# Patient Record
Sex: MALE | Race: BLACK OR AFRICAN AMERICAN
[De-identification: names, ages, dates, MRNs, and addresses within clinical notes are randomized per-mention and may not be internally consistent; named-entity substitution may affect disease eponyms.]

---

## 2018-01-16 ENCOUNTER — HOSPITAL ENCOUNTER (OUTPATIENT)
Dept: HOSPITAL 72 - PAN | Age: 83
Discharge: HOME | End: 2018-01-16
Payer: MEDICARE

## 2018-01-16 VITALS — DIASTOLIC BLOOD PRESSURE: 63 MMHG | SYSTOLIC BLOOD PRESSURE: 164 MMHG

## 2018-01-16 VITALS — WEIGHT: 200 LBS | BODY MASS INDEX: 28.63 KG/M2 | HEIGHT: 70 IN

## 2018-01-16 DIAGNOSIS — M19.90: ICD-10-CM

## 2018-01-16 DIAGNOSIS — Z91.81: ICD-10-CM

## 2018-01-16 DIAGNOSIS — C61: ICD-10-CM

## 2018-01-16 DIAGNOSIS — K57.90: ICD-10-CM

## 2018-01-16 DIAGNOSIS — N40.0: ICD-10-CM

## 2018-01-16 DIAGNOSIS — R63.4: Primary | ICD-10-CM

## 2018-01-16 DIAGNOSIS — Z86.010: ICD-10-CM

## 2018-01-16 PROCEDURE — 99202 OFFICE O/P NEW SF 15 MIN: CPT

## 2018-01-16 NOTE — GI INITIAL CONSULT NOTE
History of Present Illness


General


Date patient seen:  Jan 16, 2018


Time patient seen:  10:59


Referring physician:  TD GORDON


Reason for Consultation:  UNINTENTIONAL WEIGHT LOSS





Present Illness


HPI


88 year old male patient referred by Dr. Gordon for unintentional weight 

loss.  According to patient he's lost approximately 20-40 pounds over the 

course of the year.  No complaints of GI symptoms at the time.  [facility] records 

reviewed >> patient had colonoscopy and cardiac cath in 2011.   Patient is fall 

risk.


Home Meds


Active Scripts


Ondansetron (Zofran) 4 Mg Tablet, 4 MG ORAL Q6H Y for Nausea & Vomiting, #20 

TAB 0 Refills


   Prov:POLINA TORRES M.D.         10/12/16


Hydrocodone Bit/Acetaminophen 5-325* (NORCO 5-325*) 1 Each Tablet, 1 TAB ORAL 

Q6H Y for For Pain, #12 TAB 0 Refills


   Prov:JANETTE CHEEMA         6/19/15


Reported Medications


Enzalutamide (XTANDI) 40 Mg Capsule, 40 MG ORAL, MG 0 Refills


   1/16/18


Multivitamins* (MULTIVITAMINS*) 1 Each Tablet, 1 TAB ORAL DAILY, TAB 0 Refills


   10/12/16


Med list reviewed/reconciled:  Yes


Allergies:  


Coded Allergies:  


     NO KNOWN ALLERGIES (Unverified  Allergy, Unknown, 6/19/15)





Patient History


History Provided By:  Patient, Medical Record


PMH Narrative


Diverticulitis


Arthritis


BPH


Prostate CA with radiation tx


multiple colonic polyps


diverticulosis





Past Surgical History:


colonoscopy 2011


cardiac cath 2011


Social History Narrative


Tobacco use 25+ years


ETOH use social


coffee daily





Review of Systems


All Other Systems:  negative except mentioned in HPI





Physical Exam


T 98.4


/63


P ~ 50-80 (irregular)


95 RA





HT 5'10


 lbs


Sp02 EP Interpretation:  reviewed, normal


General Appearance:  well appearing, no apparent distress, alert


Head:  normocephalic


EENT:  PERRL/EOMI, normal ENT inspection


Neck:  supple


Respiratory:  normal breath sounds, no respiratory distress


Cardiovascular:  normal rate


Gastrointestinal:  normal inspection, non tender, soft, normal bowel sounds, non

-distended


Rectal:  deferred


Genitourinary:  deferred


Musculoskeletal:  decreased range of motion - use of cane


Neurologic:  normal inspection, alert, oriented x3, responsive


Psychiatric:  normal inspection, judgement/insight normal, memory normal


Skin:  normal inspection, normal color, no rash, warm/dry, palpation normal, 

well hydrated


Lymphatic:  normal inspection, no adenopathy





GI: Plan


Problems:  


(1) Prostate CA


(2) BPH (benign prostatic hyperplasia)


(3) Hx of colonic polyp


(4) Diverticulosis


(5) Weight loss, unintentional


Plan


given advanced age, conservative management >> will consider GI procedures 

pending work up.


Pan CT ordered


labs to be drawn >> CBC, CMP, CEA, PSA, TSH/Free T4, CA 19-9.


RTC after imaging studies and lab draws.


fu with cardiologist given irregular heartbeat/HTN.





Seen with Dr. Ozuna.


Thank you for this patient referral.











Mae Torres N.P. Jan 16, 2018 11:10

## 2018-01-17 ENCOUNTER — HOSPITAL ENCOUNTER (OUTPATIENT)
Dept: HOSPITAL 72 - CAT | Age: 83
Discharge: HOME | End: 2018-01-17
Payer: MEDICARE

## 2018-01-17 DIAGNOSIS — I70.90: ICD-10-CM

## 2018-01-17 DIAGNOSIS — R63.4: Primary | ICD-10-CM

## 2018-01-17 DIAGNOSIS — R07.9: ICD-10-CM

## 2018-01-17 DIAGNOSIS — M47.9: ICD-10-CM

## 2018-01-17 DIAGNOSIS — J90: ICD-10-CM

## 2018-01-17 DIAGNOSIS — K57.90: ICD-10-CM

## 2018-01-17 DIAGNOSIS — K80.80: ICD-10-CM

## 2018-01-17 LAB
ADD MANUAL DIFF: NO
ALBUMIN SERPL-MCNC: 3.5 G/DL (ref 3.4–5)
ALBUMIN/GLOB SERPL: 0.8 {RATIO} (ref 1–2.7)
ALP SERPL-CCNC: 59 U/L (ref 46–116)
ALT SERPL-CCNC: 14 U/L (ref 12–78)
ANION GAP SERPL CALC-SCNC: 4 MMOL/L (ref 5–15)
AST SERPL-CCNC: 19 U/L (ref 15–37)
BASOPHILS NFR BLD AUTO: 0.8 % (ref 0–2)
BILIRUB SERPL-MCNC: 0.4 MG/DL (ref 0.2–1)
BUN SERPL-MCNC: 25 MG/DL (ref 7–18)
CALCIUM SERPL-MCNC: 10.2 MG/DL (ref 8.5–10.1)
CHLORIDE SERPL-SCNC: 106 MMOL/L (ref 98–107)
CO2 SERPL-SCNC: 32 MMOL/L (ref 21–32)
CREAT SERPL-MCNC: 0.9 MG/DL (ref 0.55–1.3)
EOSINOPHIL NFR BLD AUTO: 1.7 % (ref 0–3)
ERYTHROCYTE [DISTWIDTH] IN BLOOD BY AUTOMATED COUNT: 11.2 % (ref 11.6–14.8)
GLOBULIN SER-MCNC: 4.5 G/DL
HCT VFR BLD CALC: 40.4 % (ref 42–52)
HGB BLD-MCNC: 13.4 G/DL (ref 14.2–18)
LYMPHOCYTES NFR BLD AUTO: 30 % (ref 20–45)
MCV RBC AUTO: 95 FL (ref 80–99)
MONOCYTES NFR BLD AUTO: 8 % (ref 1–10)
NEUTROPHILS NFR BLD AUTO: 59.5 % (ref 45–75)
PLATELET # BLD: 142 K/UL (ref 150–450)
POTASSIUM SERPL-SCNC: 5.2 MMOL/L (ref 3.5–5.1)
RBC # BLD AUTO: 4.25 M/UL (ref 4.7–6.1)
SODIUM SERPL-SCNC: 142 MMOL/L (ref 136–145)
WBC # BLD AUTO: 3.6 K/UL (ref 4.8–10.8)

## 2018-01-17 PROCEDURE — 71260 CT THORAX DX C+: CPT

## 2018-01-17 PROCEDURE — 82378 CARCINOEMBRYONIC ANTIGEN: CPT

## 2018-01-17 PROCEDURE — 74177 CT ABD & PELVIS W/CONTRAST: CPT

## 2018-01-17 PROCEDURE — 85025 COMPLETE CBC W/AUTO DIFF WBC: CPT

## 2018-01-17 PROCEDURE — 80053 COMPREHEN METABOLIC PANEL: CPT

## 2018-01-17 PROCEDURE — 84153 ASSAY OF PSA TOTAL: CPT

## 2018-01-17 PROCEDURE — 84439 ASSAY OF FREE THYROXINE: CPT

## 2018-01-17 PROCEDURE — 84443 ASSAY THYROID STIM HORMONE: CPT

## 2018-01-17 PROCEDURE — 36415 COLL VENOUS BLD VENIPUNCTURE: CPT

## 2018-01-17 NOTE — DIAGNOSTIC IMAGING REPORT
Indication: Weight loss. Chest and abdominal pain

 

Technique: Continuous helical transaxial imaging of the chest, abdomen and pelvis was

obtained from the lung bases to the pubic symphysis during intravenous contrast

administration. Multiple phases of enhancement obtained. Coronal 2-D reformats were

also obtained. Study obtained in a Siemens sensation 64 slice CT. Automatic Exposure

Control was utilized.

 

 

Total Dose length Product (DLP):  1760.85 mGycm

 

CT Dose Index Volume (CTDIvol):   17.31,15.98 mGy

 

Comparison: None

 

Findings: Lungs are essentially clear. No mass or nodules are identified. There is no

adenopathy identified. There is a small pericardial effusion. The aorta is mildly

calcified. The right hemidiaphragm is somewhat elevated in a localized fashion

involving the anterior aspect of the right hemidiaphragm. This has the appearance of

an eventration or possibly a small anterior diaphragmatic hernia. Part of the liver

and chest exposed hepatic flexure are contained in the herniated segment.

 

There are innumerable cysts of varying sizes within both kidneys. There are

gallstones. The liver is unremarkable. Spleen is normal size. Pancreas is grossly

unremarkable. There is a punctate calcification in the right kidney. This could be

vascular in nature or a tiny nonobstructive stone. The pancreas is unremarkable.

There are diverticula in the colon especially in the sigmoid region. Urinary bladder

is unremarkable. There is narrowing of vacuum intervertebral discs and accompanying

endplate osteophyte formation.  Hypertrophied facet joints also demonstrated.

 

IMPRESSION:

 

No evidence of malignancy on the basis of this examination.

 

Innumerable cysts within both kidneys. The nature of the cystic disease is unknown.

Consider autosomal dominant polycystic kidney disease, other possibilities.

 

Small pericardial effusion

 

Gallstones

 

Focal eventration versus anterior right diaphragmatic hernia.

 

Interposition of hepatic flexure between the diaphragm and liver.

 

Moderate spondylosis

 

Sigmoid diverticulosis.

 

Atherosclerotic vascular disease.

 

Tiny calcification in the right kidney. Vascular disease versus small nonobstructive

stone.

 

 

 

 

 

The CT scanner at [facility] is accredited by the American College of

Radiology and the scans are performed using dose optimization techniques as

appropriate to a performed exam including Automatic Exposure control.

## 2018-02-05 ENCOUNTER — HOSPITAL ENCOUNTER (OUTPATIENT)
Dept: HOSPITAL 72 - PAN | Age: 83
Discharge: HOME | End: 2018-02-05
Payer: MEDICARE

## 2018-02-05 DIAGNOSIS — I10: ICD-10-CM

## 2018-02-05 DIAGNOSIS — C61: ICD-10-CM

## 2018-02-05 DIAGNOSIS — I49.9: ICD-10-CM

## 2018-02-05 DIAGNOSIS — R63.4: Primary | ICD-10-CM

## 2018-02-05 PROCEDURE — 99212 OFFICE O/P EST SF 10 MIN: CPT

## 2018-02-05 NOTE — GI PROGRESS NOTE
Assessment/Plan


Problems:  


(1) HTN (hypertension)


ICD Codes:  I10 - Essential (primary) hypertension


SNOMED:  39451941


(2) Irregular heart rate


ICD Codes:  I49.9 - Cardiac arrhythmia, unspecified


SNOMED:  011490479, 507310353, 520439559


(3) Weight loss, unintentional


ICD Codes:  R63.4 - Abnormal weight loss


SNOMED:  007176136


(4) Prostate CA


ICD Codes:  C61 - Malignant neoplasm of prostate


SNOMED:  352683465


Status:  stable


Status Narrative


Seen with Dr. Pitts.


Assessment/Plan


Pan CT reviewed >> negative for malignancy


labs reviewed >> discussed with patient





Needs repeat EGD/colonoscopy given history of multiple polyps but will require 

cardiac clearance


d/w with Dr. Gordon about cardiology referral


will schedule for procedures once cleared


RTC after clearance








The patient was seen and examined at bedside and all new and available data was 

reviewed in the patients chart. I agree with the above findings, impression 

and plan.  (Patient seen earlier today. Signature stamp does not reflect 

patient encounter time.). - Paulette Pitts MD





Subjective


Gastrointestinal/Abdominal:  Reports: no symptoms


Subjective


weight gain of 4lbs since last visit





Objective


T 98.0


/71


HR irregular


95 RA





.5


General Appearance:  WD/WN, no apparent distress, alert


Cardiovascular:  normal rate


Respiratory/Chest:  normal breath sounds, no respiratory distress


Abdominal Exam:  normal bowel sounds, non tender, soft


Extremities:  normal range of motion, non-tender, other - ambulates with cane











Mae Telles NDEBBY Feb 5, 2018 14:11


MAGNUS PITTS Feb 6, 2018 10:14

## 2018-03-19 ENCOUNTER — HOSPITAL ENCOUNTER (OUTPATIENT)
Dept: HOSPITAL 72 - PAN | Age: 83
Discharge: HOME | End: 2018-03-19
Payer: MEDICARE

## 2018-03-19 VITALS — SYSTOLIC BLOOD PRESSURE: 143 MMHG | DIASTOLIC BLOOD PRESSURE: 65 MMHG

## 2018-03-19 DIAGNOSIS — C61: ICD-10-CM

## 2018-03-19 DIAGNOSIS — R63.4: ICD-10-CM

## 2018-03-19 DIAGNOSIS — I49.9: Primary | ICD-10-CM

## 2018-03-19 DIAGNOSIS — Z86.010: ICD-10-CM

## 2018-03-19 PROCEDURE — 99211 OFF/OP EST MAY X REQ PHY/QHP: CPT

## 2018-03-19 NOTE — GI PROGRESS NOTE
Assessment/Plan


Problems:  


(1) Irregular heart rate


ICD Codes:  I49.9 - Cardiac arrhythmia, unspecified


SNOMED:  683229962, 156468536, 380557184


(2) Weight loss, unintentional


ICD Codes:  R63.4 - Abnormal weight loss


SNOMED:  053173045


(3) Prostate CA


ICD Codes:  C61 - Malignant neoplasm of prostate


SNOMED:  772090142


(4) Hx of colonic polyp


ICD Codes:  Z86.010 - Personal history of colonic polyps


SNOMED:  902259666


(5) Colonoscopy planned


SNOMED:  297046959


Status:  stable


Status Narrative


Discussed with Dr. Ozuna.


Assessment/Plan


Pan CT reviewed >> negative for malignancy


labs reviewed >> discussed with patient





EGD/colonoscopy scheduled 3/26/17.


- CLD & (Nulytely/Suprep/Movi-Prep) prep instructions given and acknowledged by 

patient.


- NPO @ MN day prior procedure explained.





Subjective


Gastrointestinal/Abdominal:  Reports: no symptoms





Objective


T 98.3


/65


HR 42


95 RA





 lbs


General Appearance:  WD/WN, no apparent distress, alert


Cardiovascular:  normal rate


Respiratory/Chest:  normal breath sounds, no respiratory distress


Abdominal Exam:  normal bowel sounds, non tender, soft


Extremities:  normal range of motion, non-tender











Mae Telles N.P. Mar 19, 2018 14:49

## 2018-03-26 ENCOUNTER — HOSPITAL ENCOUNTER (OUTPATIENT)
Dept: HOSPITAL 72 - GAS | Age: 83
Discharge: HOME | End: 2018-03-26
Payer: MEDICARE

## 2018-03-26 VITALS — DIASTOLIC BLOOD PRESSURE: 79 MMHG | SYSTOLIC BLOOD PRESSURE: 161 MMHG

## 2018-03-26 VITALS — SYSTOLIC BLOOD PRESSURE: 179 MMHG | DIASTOLIC BLOOD PRESSURE: 78 MMHG

## 2018-03-26 VITALS — SYSTOLIC BLOOD PRESSURE: 161 MMHG | DIASTOLIC BLOOD PRESSURE: 89 MMHG

## 2018-03-26 VITALS — WEIGHT: 204 LBS | HEIGHT: 72 IN | BODY MASS INDEX: 27.63 KG/M2

## 2018-03-26 VITALS — DIASTOLIC BLOOD PRESSURE: 76 MMHG | SYSTOLIC BLOOD PRESSURE: 180 MMHG

## 2018-03-26 VITALS — DIASTOLIC BLOOD PRESSURE: 68 MMHG | SYSTOLIC BLOOD PRESSURE: 153 MMHG

## 2018-03-26 VITALS — SYSTOLIC BLOOD PRESSURE: 180 MMHG | DIASTOLIC BLOOD PRESSURE: 72 MMHG

## 2018-03-26 VITALS — DIASTOLIC BLOOD PRESSURE: 77 MMHG | SYSTOLIC BLOOD PRESSURE: 185 MMHG

## 2018-03-26 DIAGNOSIS — K29.70: Primary | ICD-10-CM

## 2018-03-26 DIAGNOSIS — I10: ICD-10-CM

## 2018-03-26 DIAGNOSIS — K31.7: ICD-10-CM

## 2018-03-26 DIAGNOSIS — K57.90: ICD-10-CM

## 2018-03-26 DIAGNOSIS — K63.5: ICD-10-CM

## 2018-03-26 DIAGNOSIS — K64.8: ICD-10-CM

## 2018-03-26 DIAGNOSIS — M19.90: ICD-10-CM

## 2018-03-26 DIAGNOSIS — K21.9: ICD-10-CM

## 2018-03-26 PROCEDURE — 45380 COLONOSCOPY AND BIOPSY: CPT

## 2018-03-26 PROCEDURE — 94150 VITAL CAPACITY TEST: CPT

## 2018-03-26 PROCEDURE — 93005 ELECTROCARDIOGRAM TRACING: CPT

## 2018-03-26 PROCEDURE — 94003 VENT MGMT INPAT SUBQ DAY: CPT

## 2018-03-26 PROCEDURE — 82378 CARCINOEMBRYONIC ANTIGEN: CPT

## 2018-03-26 PROCEDURE — 43239 EGD BIOPSY SINGLE/MULTIPLE: CPT

## 2018-03-26 NOTE — SHORT STAY SURGERY H&P
History of Present Illness


History of Present Illness


Chief Complaint


see recent consult note


HPI


Solis Horne is a 89 year old male who was admitted on  for Colon Polyps,

Abnormal Weight Lose





Patient History


Allergies:  


Coded Allergies:  


     NO KNOWN ALLERGIES (Unverified  Allergy, Unknown, 6/19/15)





Medication History


Scheduled


Multivitamins* (Multivitamins*), 1 TAB ORAL DAILY, (Reported)





Discontinued Medications


Enzalutamide (Xtandi), 40 MG ORAL, (Reported)


   Discontinued Reason: MD discontinued med


Hydrocodone Bit/Acetaminophen 5-325* (Norco 5-325*), 1 TAB ORAL Q6H PRN for For 

Pain


   Discontinued Reason: MD discontinued med


Ondansetron (Zofran), 4 MG ORAL Q6H PRN for Nausea & Vomiting


   Discontinued Reason: Pt stopped taking med





Physical Exam


Vital Signs





Last Vital Signs








  Date Time  Temp Pulse Resp B/P (MAP) Pulse Ox O2 Delivery O2 Flow Rate FiO2


 


3/26/18 08:48 97.3 56 18 161/89 99 Room Air  





 97.3       











Plan


Attestation


Are the patient's medical conditions optimized for surgery?











MAGNUS PITTS Mar 26, 2018 10:13

## 2018-03-26 NOTE — ENDOSCOPY PROCEDURE NOTE
Endoscopy Procedure Note


General


Indication for Procedure:  wt loss


Procedures Performed:  EGD, colonoscopy


Operative Findings/Diagnosis:  gastric and colon polyps


Specimen:  yes


Pt Tolerated Procedure Well:  Yes


Estimated Blood Loss:  none





Anesthesia


Anesthesiologist:  lanie


Anesthesia:  MAC





Inserted Devices


Implant(s) used?:  No





Quality


Quality of Bowel Preparation:  Excellent


Did scope reach the cecum?:  Yes


Was there any complications?:  No





GI Core Measures


50 yrs or older w/o bx or poly:  No


10yrs. F/U not recommended:  Yes


If not recommended, why?:  Above average risk


10 yrs. F/U needed:  Yes


18 years or older w/prev. colo:  Yes


<3yrs. since last colonoscopy:  No











MAGNUS PITTS Mar 26, 2018 10:54

## 2018-03-26 NOTE — ANETHESIA PREOPERATIVE EVAL
Anesthesia Pre-op PMH/ROS


General


Date of Evaluation:  Mar 26, 2018


Time of Evaluation:  10:01


Anesthesiologist:  Germaine


ASA Score:  ASA 3


Mallampati Score


Class I : Soft palate, uvula, fauces, pillars visible


Class II: Soft palate, uvula, fauces visible


Class III: Soft palate, base of uvula visible


Class IV: Only hard plate visible


Mallampati Classification:  Class II


Surgeon:  Laith


Diagnosis:  Abdominal pain weight loss


Surgical Procedure:  EGD Colonoscopy


Anesthesia History:  none


Family History:  no anesthesia problems


Allergies:  


Coded Allergies:  


     NO KNOWN ALLERGIES (Unverified  Allergy, Unknown, 6/19/15)


Medications:  see eMAR





Past Medical History


Cardiovascular:  Reports: HTN, arrhythmia; 


   Denies: CAD, MI, valve dz, other


Pulmonary:  Denies: asthma, COPD, CAROL, other


Gastrointestinal/Genitourinary:  Reports: GERD; 


   Denies: CRI, ESRD, other


Neurologic/Psychiatric:  Denies: dementia, CVA, depression/anxiety, TIA, other


Endocrine:  Denies: DM, hypothyroidism, steroids, other


HEENT:  Denies: cataract (L), cataract (R), glaucoma, Koi (L), Koi (R), other


Hematology/Immune:  Reports: anemia - mild


Musculoskeletal/Integumentary:  Reports: DJD; 


   Denies: OA, RA, DDD, edema, other


PMH Narrative:


as above


PSxH Narrative:


circumcision





Anesthesia Pre-op Phys. Exam


Physician Exam





Last Vital Signs








  Date Time  Temp Pulse Resp B/P (MAP) Pulse Ox O2 Delivery O2 Flow Rate FiO2


 


3/26/18 08:48 97.3 56 18 161/89 99 Room Air  





 97.3       








Constitutional:  NAD


Neurologic:  CN 2-12 intact


Cardiovascular:  no M/R/G, other - IIR


Respiratory:  CTA


Gastrointestinal:  S/NT/ND





Airway Exam


Mallampati Score:  Class II


MO:  limited


Neck:  stiff


ROM:  limited


Teeth:  missing


Dentures:  no upper, no lower





Anesthesia Pre-op A/P


Risk Assessment & Plan


Assessment:


ASA 3


Plan:


MAC


Status Change Before Surgery:  No











JORDI MYERS M.D. Mar 26, 2018 10:36

## 2018-03-26 NOTE — IMMEDIATE POST-OP EVALUATION
Immediate Post-Op Evalulation


Immediate Post-Op Evalulation


Procedure:  EGD Colonoscopy


Date of Evaluation:  Mar 26, 2018


Time of Evaluation:  11:01


IV Fluids:  600


Blood Products:  none


Estimated Blood Loss:  none


Urinary Output:  none


Blood Pressure Systolic:  165


Blood Pressure Diastolic:  76


Pulse Rate:  61


Respiratory Rate:  22


O2 Sat by Pulse Oximetry:  98


Temperature (Fahrenheit):  97.6


Pain Score (1-10):  1


Nausea:  No


Vomiting:  No


Complications


none


Patient Status:  awake, patent, none


Hydration Status:  adequate











JORDI MYERS M.D. Mar 26, 2018 11:02

## 2018-03-26 NOTE — PROCEDURE NOTE
DATE OF PROCEDURE:  03/26/2018



SURGEON:  Michele Ozuna M.D.



PROCEDURE:  Upper endoscopy with biopsy and colonoscopy with

biopsy.



ANESTHESIA:  Yoshi Herron M.D.



INSTRUMENT:  Olympus flexible adult endoscope and colonoscope.



INDICATION:  Weight loss.



REASON FOR PROCEDURE:  The procedure, risks, benefits, and possible

consequences, including hemorrhage, aspiration, perforation and infection,

and alternative treatments, were explained to the patient/legal guardian

by Dr. Michele Ozuna and the patient/legal guardian understood and

accepted these risks.



DESCRIPTION OF PROCEDURE:  After informed consent was obtained and the

patient was adequately sedated, the Olympus upper endoscope was advanced

from mouth to the second portion of the duodenum and retroflexion was

performed of the stomach.



The patient had evidence of diffuse moderate to severe gastritis.  There

was an area in the peripyloric region.  In the antrum of the stomach,

there was an about one centimeter raised lesion.  Differential diagnosis

would be a gastric polyp versus healing ulcer versus early  malignancy.

Biopsy from this lesion was obtained.



There was another polyp, smaller upon proximal to this one, which was

biopsied.  After that we did biopsy of the antrum and body.  At this time,

the upper endoscope was retrieved and the patient was turned over for

colonoscopy.



First, a rectal exam performed, which was positive for internal

hemorrhoids.  Then, the scope was advanced from the rectum into the cecum

documented by appendiceal orifice, ileocecal valve, and right upper

quadrant palpation.  Quality of prep was very good.



The patient had evidence of two polyps, diminutive in the ascending

colon removed with the cold biopsy forceps technique.  There was no

further polyp seen in this examination.  The patient has evidence of

diverticulosis, more prominent in the left compared to right colon.



Retroflexion of rectum showed evidence of medium-sized internal

hemorrhoids.



SUMMARY FINDINGS:

1. Gastric polyp versus early malignancy status post biopsy.

2. Another smaller gastric polyp.

3. Diffuse moderate to severe gastritis status post biopsy.

4. Two colonic polyps removed, see above for details.

5. Diverticulosis.

6. Internal hemorrhoids.



RECOMMENDATIONS:  Followup biopsy results and treat accordingly.









  ______________________________________________

  Michele Ozuna M.D.





DR:  CAN

D:  03/26/2018 10:53

T:  03/26/2018 20:18

JOB#:  [id number]

CC:

## 2018-03-26 NOTE — PRE-PROCEDURE NOTE/ATTESTATION
Pre-Procedure Note/Attestation


Complete Prior to Procedure


Planned Procedure:  not applicable


Procedure Narrative:


esophagogastroduodenoscopy and colonoscopy





Indications for Procedure


Pre-Operative Diagnosis:


wt loss





Attestation


I attest that I discussed the nature of the procedure; its benefits; risks and 

complications; and alternatives (and the risks and benefits of such alternatives

), prior to the procedure, with the patient (or the patient's legal 

representative).





I attest that, if there was a reasonable possibility of needing a blood 

transfusion, the patient (or the patient's legal representative) was given the 

[facility] standardized written summary, pursuant 

to the [place] Blood Safety Act ([place] Health and Safety Code # 1645, as 

amended).





I attest that I re-evaluated the patient just prior to the surgery and that 

there has been no change in the patient's H&P, except as documented below:











MAGNUS PITTS Mar 26, 2018 10:10

## 2018-03-26 NOTE — 48 HOUR POST ANESTHESIA EVAL
Post Anesthesia Evaluation


Procedure:  EGD Colonoscopy


Date of Evaluation:  Mar 26, 2018


Time of Evaluation:  11:31


Blood Pressure Systolic:  128


0:  76


Pulse Rate:  64


Respiratory Rate:  20


Temperature (Fahrenheit):  97.8


O2 Sat by Pulse Oximetry:  99


Airway:  patent


Nausea:  No


Vomiting:  No


Pain Intensity:  2


Hydration Status:  adequate


Cardiopulmonary Status:


stable


Mental Status/LOC:  patient returned to baseline


Follow-up Care/Observations:


n/a


Post-Anesthesia Complications:


none


Follow-up care needed:  ready to discharge











JORDI MYERS M.D. Mar 26, 2018 11:32

## 2018-04-09 ENCOUNTER — HOSPITAL ENCOUNTER (OUTPATIENT)
Dept: HOSPITAL 72 - PAN | Age: 83
Discharge: HOME | End: 2018-04-09
Payer: MEDICARE

## 2018-04-09 DIAGNOSIS — R13.10: ICD-10-CM

## 2018-04-09 DIAGNOSIS — B96.81: ICD-10-CM

## 2018-04-09 DIAGNOSIS — R63.4: Primary | ICD-10-CM

## 2018-04-09 PROCEDURE — 99212 OFFICE O/P EST SF 10 MIN: CPT

## 2018-04-09 NOTE — GI PROGRESS NOTE
Assessment/Plan


Problems:  


(1) H. pylori infection


ICD Codes:  A04.8 - Other specified bacterial intestinal infections


SNOMED:  627599492


(2) Weight loss, unintentional


ICD Codes:  R63.4 - Abnormal weight loss


SNOMED:  484028461


Status:  stable


Status Narrative


Seen with Dr. Ozuna.


Assessment/Plan


low grade dysphagia


H. Pylori positive





Recommendations


Miralax 17gm


colace


H. Pylori Treatment >>


- Amoxicillin 1g BID


- Biaxin 500mb BID


- Omeprazole 40mg x 2 weeks


followed by 


Omeprazole 40mg PO daily x 6 weeks


RTC x 2 month for repeat Breath Test/EGD schedule





The patient was seen and examined at bedside and all new and available data was 

reviewed in the patients chart. I agree with the above findings, impression 

and plan.  (Patient seen earlier today. Signature stamp does not reflect 

patient encounter time.). - Paulette Ozuna MD





Subjective


Subjective


no symptoms





Objective


T 98.0


/67


P 59


97 RA





.9 lbs


General Appearance:  WD/WN, no apparent distress, alert


Cardiovascular:  normal rate


Respiratory/Chest:  normal breath sounds, no respiratory distress


Abdominal Exam:  normal bowel sounds, non tender, soft


Extremities:  normal range of motion, non-tender











Mae Telles NDEBBY Apr 9, 2018 15:23


MAGNUS ZOUNA Apr 10, 2018 13:17

## 2018-06-11 ENCOUNTER — HOSPITAL ENCOUNTER (OUTPATIENT)
Dept: HOSPITAL 72 - PAN | Age: 83
Discharge: HOME | End: 2018-06-11
Payer: MEDICARE

## 2018-06-11 DIAGNOSIS — I10: ICD-10-CM

## 2018-06-11 DIAGNOSIS — K59.00: ICD-10-CM

## 2018-06-11 DIAGNOSIS — I49.9: ICD-10-CM

## 2018-06-11 DIAGNOSIS — N40.0: Primary | ICD-10-CM

## 2018-06-11 DIAGNOSIS — A04.8: ICD-10-CM

## 2018-06-11 PROCEDURE — 83013 H PYLORI (C-13) BREATH: CPT

## 2018-06-11 PROCEDURE — 99211 OFF/OP EST MAY X REQ PHY/QHP: CPT

## 2018-06-11 NOTE — GI PROGRESS NOTE
Assessment/Plan


Problems:  


(1) H. pylori infection


ICD Codes:  A04.8 - Other specified bacterial intestinal infections


SNOMED:  357722248


(2) Irregular heart rate


ICD Codes:  I49.9 - Cardiac arrhythmia, unspecified


SNOMED:  507085387, 784318500, 887321057


(3) HTN (hypertension)


ICD Codes:  I10 - Essential (primary) hypertension


SNOMED:  51869919


(4) BPH (benign prostatic hyperplasia)


ICD Codes:  N40.0 - Benign prostatic hyperplasia without lower urinary tract 

symptoms


SNOMED:  624435426


Status:  stable


Status Narrative


Seen with Dr. Ozuna.


Assessment/Plan


repeat BT today


plan for repeat EGD when cleared by cardiology





The patient was seen and examined at bedside and all new and available data was 

reviewed in the patients chart. I agree with the above findings, impression 

and plan.  (Patient seen earlier today. Signature stamp does not reflect 

patient encounter time.). - Michele Ozuna MD





Subjective


Gastrointestinal/Abdominal:  Reports: constipated





Objective


General Appearance:  WD/WN, no apparent distress, alert


Cardiovascular:  normal rate


Respiratory/Chest:  normal breath sounds, no respiratory distress


Abdominal Exam:  normal bowel sounds, non tender, soft


Extremities:  normal range of motion, non-tender











Christ Telles NP Jun 11, 2018 15:23

## 2018-07-20 ENCOUNTER — HOSPITAL ENCOUNTER (OUTPATIENT)
Dept: HOSPITAL 72 - GAS | Age: 83
Discharge: HOME | End: 2018-07-20
Payer: MEDICARE

## 2018-07-20 VITALS — SYSTOLIC BLOOD PRESSURE: 175 MMHG | DIASTOLIC BLOOD PRESSURE: 71 MMHG

## 2018-07-20 VITALS — SYSTOLIC BLOOD PRESSURE: 188 MMHG | DIASTOLIC BLOOD PRESSURE: 74 MMHG

## 2018-07-20 VITALS — BODY MASS INDEX: 33.32 KG/M2 | WEIGHT: 200 LBS | HEIGHT: 65 IN

## 2018-07-20 VITALS — SYSTOLIC BLOOD PRESSURE: 165 MMHG | DIASTOLIC BLOOD PRESSURE: 70 MMHG

## 2018-07-20 VITALS — DIASTOLIC BLOOD PRESSURE: 76 MMHG | SYSTOLIC BLOOD PRESSURE: 175 MMHG

## 2018-07-20 VITALS — DIASTOLIC BLOOD PRESSURE: 74 MMHG | SYSTOLIC BLOOD PRESSURE: 179 MMHG

## 2018-07-20 VITALS — DIASTOLIC BLOOD PRESSURE: 69 MMHG | SYSTOLIC BLOOD PRESSURE: 173 MMHG

## 2018-07-20 VITALS — DIASTOLIC BLOOD PRESSURE: 82 MMHG | SYSTOLIC BLOOD PRESSURE: 186 MMHG

## 2018-07-20 DIAGNOSIS — K29.50: Primary | ICD-10-CM

## 2018-07-20 DIAGNOSIS — I48.91: ICD-10-CM

## 2018-07-20 DIAGNOSIS — F17.200: ICD-10-CM

## 2018-07-20 DIAGNOSIS — J44.9: ICD-10-CM

## 2018-07-20 DIAGNOSIS — K31.7: ICD-10-CM

## 2018-07-20 DIAGNOSIS — K57.90: ICD-10-CM

## 2018-07-20 DIAGNOSIS — N40.0: ICD-10-CM

## 2018-07-20 DIAGNOSIS — I10: ICD-10-CM

## 2018-07-20 DIAGNOSIS — K21.9: ICD-10-CM

## 2018-07-20 DIAGNOSIS — Z85.46: ICD-10-CM

## 2018-07-20 LAB
ADD MANUAL DIFF: NO
ALBUMIN SERPL-MCNC: 3.2 G/DL (ref 3.4–5)
ALBUMIN/GLOB SERPL: 0.8 {RATIO} (ref 1–2.7)
ALP SERPL-CCNC: 55 U/L (ref 46–116)
ALT SERPL-CCNC: 22 U/L (ref 12–78)
ANION GAP SERPL CALC-SCNC: 6 MMOL/L (ref 5–15)
AST SERPL-CCNC: 26 U/L (ref 15–37)
BASOPHILS NFR BLD AUTO: 0.2 % (ref 0–2)
BILIRUB SERPL-MCNC: 0.3 MG/DL (ref 0.2–1)
BUN SERPL-MCNC: 26 MG/DL (ref 7–18)
CALCIUM SERPL-MCNC: 9.3 MG/DL (ref 8.5–10.1)
CHLORIDE SERPL-SCNC: 105 MMOL/L (ref 98–107)
CO2 SERPL-SCNC: 29 MMOL/L (ref 21–32)
CREAT SERPL-MCNC: 1 MG/DL (ref 0.55–1.3)
EOSINOPHIL NFR BLD AUTO: 2.4 % (ref 0–3)
ERYTHROCYTE [DISTWIDTH] IN BLOOD BY AUTOMATED COUNT: 10.9 % (ref 11.6–14.8)
GLOBULIN SER-MCNC: 4.2 G/DL
HCT VFR BLD CALC: 36.3 % (ref 42–52)
HGB BLD-MCNC: 12 G/DL (ref 14.2–18)
LYMPHOCYTES NFR BLD AUTO: 27.6 % (ref 20–45)
MCV RBC AUTO: 92 FL (ref 80–99)
MONOCYTES NFR BLD AUTO: 9.2 % (ref 1–10)
NEUTROPHILS NFR BLD AUTO: 60.5 % (ref 45–75)
PLATELET # BLD: 117 K/UL (ref 150–450)
POTASSIUM SERPL-SCNC: 4.7 MMOL/L (ref 3.5–5.1)
RBC # BLD AUTO: 3.94 M/UL (ref 4.7–6.1)
SODIUM SERPL-SCNC: 140 MMOL/L (ref 136–145)
WBC # BLD AUTO: 3.5 K/UL (ref 4.8–10.8)

## 2018-07-20 PROCEDURE — 85025 COMPLETE CBC W/AUTO DIFF WBC: CPT

## 2018-07-20 PROCEDURE — 94003 VENT MGMT INPAT SUBQ DAY: CPT

## 2018-07-20 PROCEDURE — 43251 EGD REMOVE LESION SNARE: CPT

## 2018-07-20 PROCEDURE — 93005 ELECTROCARDIOGRAM TRACING: CPT

## 2018-07-20 PROCEDURE — 36415 COLL VENOUS BLD VENIPUNCTURE: CPT

## 2018-07-20 PROCEDURE — 94150 VITAL CAPACITY TEST: CPT

## 2018-07-20 PROCEDURE — 80053 COMPREHEN METABOLIC PANEL: CPT

## 2018-07-20 NOTE — ANETHESIA PREOPERATIVE EVAL
Anesthesia Pre-op PMH/ROS


General


Date of Evaluation:  Jul 20, 2018


Anesthesiologist:  Chris


ASA Score:  ASA 3


Mallampati Score


Class I : Soft palate, uvula, fauces, pillars visible


Class II: Soft palate, uvula, fauces visible


Class III: Soft palate, base of uvula visible


Class IV: Only hard plate visible


Mallampati Classification:  Class II


Surgeon:  Laith


Diagnosis:  Abd Pain


Surgical Procedure:  EGD


Anesthesia History:  none


Social History:  current smoker


Family History:  no anesthesia problems


Allergies:  


Coded Allergies:  


     NO KNOWN ALLERGIES (Unverified  Allergy, Unknown, 6/19/15)


Medications:  see eMAR





Past Medical History


Cardiovascular:  Reports: HTN, arrhythmia - AFib


Pulmonary:  Reports: COPD - Smoker


Gastrointestinal/Genitourinary:  Reports: other - Diverticulitis, Prostate CA, 

BPH


Hematology/Immune:  Reports: other - Prostse CA





Anesthesia Pre-op Phys. Exam


Physician Exam





Last Vital Signs








  Date Time  Temp Pulse Resp B/P (MAP) Pulse Ox O2 Delivery O2 Flow Rate FiO2


 


7/20/18 10:11      Room Air  


 


7/20/18 10:06 98.0 59 18 173/69 (103) 98   





 98.0       








Constitutional:  NAD


Neurologic:  CN 2-12 intact


Cardiovascular:  RRR


Respiratory:  CTA


Gastrointestinal:  S/NT/ND





Airway Exam


Mallampati Score:  Class II


MO:  limited


ROM:  limited


Teeth:  missing, intact, broken





Anesthesia Pre-op A/P


Risk Assessment & Plan


Assessment:


ASA 3


Plan:


GA


Status Change Before Surgery:  Silvino Peoples MD Jul 20, 2018 10:35

## 2018-07-20 NOTE — CARDIOLOGY REPORT
--------------- APPROVED REPORT --------------





EKG Measurement

Heart [id number]

OH 

[id number]

[id number]

[id number]

[id number]





Sinus bradycardia with 1st degree AV block

Left axis deviation

Left bundle branch block

Abnormal ECG

## 2018-07-20 NOTE — ENDOSCOPY PROCEDURE NOTE
Endoscopy Procedure Note


General


Indication for Procedure:  dysplastic polyp


Procedures Performed:  EGD


Operative Findings/Diagnosis:  gastritis


Specimen:  yes


Pt Tolerated Procedure Well:  Yes


Estimated Blood Loss:  none





Anesthesia


Anesthesiologist:  merlene


Anesthesia:  MAC





Inserted Devices


Implant(s) used?:  No





GI Core Measures


50 yrs or older w/o bx or poly:  Not Applicable


10yrs. F/U not recommended:  Not Applicable











Michele Ozuna MD Jul 20, 2018 11:08

## 2018-07-20 NOTE — IMMEDIATE POST-OP EVALUATION
Immediate Post-Op Evalulation


Immediate Post-Op Evalulation


Procedure:  EGD


Date of Evaluation:  Jul 20, 2018


Time of Evaluation:  11:53


IV Fluids:  700 LR


Blood Products:  0


Estimated Blood Loss:  2


Urinary Output:  0


Blood Pressure Systolic:  165


Blood Pressure Diastolic:  70


Pulse Rate:  56


Respiratory Rate:  16


O2 Sat by Pulse Oximetry:  98


Temperature (Fahrenheit):  98.2


Pain Score (1-10):  1


Nausea:  No


Vomiting:  No


Complications


0


Patient Status:  awake, reacts, patent, none


Hydration Status:  adequate











Silvino Perez MD Jul 20, 2018 10:57

## 2018-07-20 NOTE — 48 HOUR POST ANESTHESIA EVAL
Post Anesthesia Evaluation


Procedure:  EGD


Date of Evaluation:  Jul 20, 2018


Time of Evaluation:  13:56


Blood Pressure Systolic:  167


0:  78


Pulse Rate:  64


Respiratory Rate:  18


Temperature (Fahrenheit):  98.4


O2 Sat by Pulse Oximetry:  97


Airway:  patent


Nausea:  No


Vomiting:  No


Pain Intensity:  1


Hydration Status:  adequate


Cardiopulmonary Status:


Stable


Mental Status/LOC:  patient returned to baseline


Follow-up Care/Observations:


0


Post-Anesthesia Complications:


0


Follow-up care needed:  ready to discharge











Silvino Perez MD Jul 20, 2018 10:57

## 2018-07-20 NOTE — PRE-PROCEDURE NOTE/ATTESTATION
Pre-Procedure Note/Attestation


Complete Prior to Procedure


Planned Procedure:  not applicable


Procedure Narrative:


egd





Indications for Procedure


Pre-Operative Diagnosis:


dysplastic gastric polyp





Attestation


I attest that I discussed the nature of the procedure; its benefits; risks and 

complications; and alternatives (and the risks and benefits of such alternatives

), prior to the procedure, with the patient (or the patient's legal 

representative).





I attest that, if there was a reasonable possibility of needing a blood 

transfusion, the patient (or the patient's legal representative) was given the 

[facility] standardized written summary, pursuant 

to the [place] Blood Safety Act ([place] Health and Safety Code # 1645, as 

amended).





I attest that I re-evaluated the patient just prior to the surgery and that 

there has been no change in the patient's H&P, except as documented below:











Michele Ozuna MD Jul 20, 2018 10:42

## 2018-07-20 NOTE — SHORT STAY SURGERY H&P
History of Present Illness


History of Present Illness


Chief Complaint


dysplastic gastric polyp


HPI


Solis Horne is a 89 year old male who was admitted on  for Abdominal Pain





Patient History


Allergies:  


Coded Allergies:  


     NO KNOWN ALLERGIES (Unverified  Allergy, Unknown, 6/19/15)


PAST MEDICAL HISTORY:  


(1) Diverticulosis


(2) Prostate CA


(3) Hx of colonic polyp


(4) H. pylori infection


(5) HTN (hypertension)


(6) BPH (benign prostatic hyperplasia)


(7) Irregular heart rate





Medication History


Scheduled


Docusate Sodium* (Docusate Sodium*), 100 MG ORAL BID, (Reported)


Multivitamins* (Multivitamins*), 1 TAB ORAL DAILY, (Reported)


Valsartan (Diovan), 80 MG ORAL DAILY, (Reported)





Review of Systems


Cardiovascular:  Reports: no symptoms


Respiratory:  Reports: no symptoms


Skeletal:  Reports: no symptoms


Gastrointestinal:  Reports: gastro esophageal reflux disease


Genitourinary:  Reports: no symptoms


Neurologic:  Reports: no symptoms


Endocrine:  Reports: no symptoms


Hematologic:  Reports: no symptoms





Physical Exam


Vital Signs





Last Vital Signs








  Date Time  Temp Pulse Resp B/P (MAP) Pulse Ox O2 Delivery O2 Flow Rate FiO2


 


7/20/18 10:11      Room Air  


 


7/20/18 10:06 98.0 59 18 173/69 (103) 98   





 98.0       








Skin:  normal


HENT:  normal


Heart:  normal


Lungs:  normal


Abdomen:  normal


Extremities:  normal





Plan


Plan of Care


egd


Attestation


Are the patient's medical conditions optimized for surgery?


Attestation Response:  yes











Michele Ozuna MD Jul 20, 2018 10:43

## 2018-07-23 NOTE — PROCEDURE NOTE
DATE OF PROCEDURE:  07/20/2018



SURGEON:  Michele Ozuna M.D.



ANESTHESIOLOGIST:  Dr. Perez.



REFERRING PHYSICIAN:  Newton Gordon M.D.



PROCEDURE:  Upper endoscopy with snare polypectomy and biopsy.q



ANESTHESIA:  Per Dr. Perez.



INSTRUMENT:  Olympus adult flexible upper endoscope.



INDICATION:  Dysplastic polyp.



The procedure, risks, benefits, and possible consequences, including

hemorrhage, aspiration, perforation and infection, and alternative

treatments, were explained to the patient/legal guardian by Dr. Michele Ozuna and the patient/legal guardian understood and accepted these

risks.



DESCRIPTION OF PROCEDURE:  After informed consent was obtained and the

patient was adequately sedated, Olympus upper endoscope was advanced from

the mouth into the second portion of duodenum and retroflexion was

performed in the stomach.



There was a polyp in the ____ region in the antrum along about 9 o'clock

position.  This polyp is the one that was dysplastic based on the previous

biopsies.  At this time, we just did one large biopsy and realized it was

better to remove it with the snare polypectomy technique, so we used _____

injection, about 2 mL of _____ was injected to lift up this polyp, and

then we used hexagonal snare and completely resected it.  After resection,

this was measured about 1 cm.  Then, we went back again, washed the area

to make sure there was no perforation and did a few biopsies around that

area again.  The patient tolerated procedure very well without any

complication.



SUMMARY OF FINDINGS:  Gastric polyp, status post biopsy and polypectomy.



RECOMMENDATIONS:  Follow up final pathology and based on them, we will make

a decision for further management.



I want to thank Dr. Gordon for this kind referral.









  ______________________________________________

  Michele Ozuna M.D. DR:  [place]

D:  07/20/2018 11:23

T:  07/21/2018 00:27

JOB#:  [id number]

CC:  Newton Gordon M.D.

## 2018-08-07 ENCOUNTER — HOSPITAL ENCOUNTER (OUTPATIENT)
Dept: HOSPITAL 72 - PAN | Age: 83
Discharge: HOME | End: 2018-08-07
Payer: MEDICARE

## 2018-08-07 VITALS — SYSTOLIC BLOOD PRESSURE: 120 MMHG | DIASTOLIC BLOOD PRESSURE: 58 MMHG

## 2018-08-07 DIAGNOSIS — C61: ICD-10-CM

## 2018-08-07 DIAGNOSIS — Z86.010: ICD-10-CM

## 2018-08-07 DIAGNOSIS — K31.7: Primary | ICD-10-CM

## 2018-08-07 DIAGNOSIS — K59.00: ICD-10-CM

## 2018-08-07 PROCEDURE — 99212 OFFICE O/P EST SF 10 MIN: CPT

## 2018-08-07 NOTE — GI PROGRESS NOTE
Assessment/Plan


Problems:  


(1) Gastric polyp


ICD Codes:  K31.7 - Polyp of stomach and duodenum


SNOMED:  76747956


(2) Constipation


ICD Codes:  K59.00 - Constipation, unspecified


SNOMED:  38891689


(3) Hx of colonic polyp


ICD Codes:  Z86.010 - Personal history of colonic polyps


SNOMED:  689994647


(4) Prostate CA


ICD Codes:  C61 - Malignant neoplasm of prostate


SNOMED:  397228006


Status:  stable


Status Narrative


Seen with Dr. Ozuna.


Assessment/Plan


SUMMARY OF FINDINGS reviewed with patient:  


Gastric polyp, status post biopsy and polypectomy. >> Bx show low grade 

dysplasia 





RECOMMENDATIONS: 


cont colace + miralax


cont omeprazole


RTC x 3 months for repeat EGD given low grade dysplasia





The patient was seen and examined at bedside and all new and available data was 

reviewed in the patients chart. I agree with the above findings, impression 

and plan.  (Patient seen earlier today. Signature stamp does not reflect 

patient encounter time.). - Michele Ozuna MD





Subjective


Subjective


constipation >> taking colace and miralax





Objective


T 98.0


/58


P 76


94 RA


General Appearance:  WD/WN, no apparent distress, alert


Cardiovascular:  normal rate


Respiratory/Chest:  normal breath sounds, no respiratory distress


Abdominal Exam:  normal bowel sounds, non tender, soft


Extremities:  normal range of motion, non-tender











Christ Telles NP Aug 7, 2018 10:29

## 2018-10-08 ENCOUNTER — HOSPITAL ENCOUNTER (EMERGENCY)
Dept: HOSPITAL 72 - EMR | Age: 83
Discharge: HOME | End: 2018-10-08
Payer: MEDICARE

## 2018-10-08 VITALS — DIASTOLIC BLOOD PRESSURE: 60 MMHG | SYSTOLIC BLOOD PRESSURE: 102 MMHG

## 2018-10-08 VITALS — BODY MASS INDEX: 24.92 KG/M2 | HEIGHT: 71 IN | WEIGHT: 178 LBS

## 2018-10-08 VITALS — SYSTOLIC BLOOD PRESSURE: 135 MMHG | DIASTOLIC BLOOD PRESSURE: 65 MMHG

## 2018-10-08 VITALS — DIASTOLIC BLOOD PRESSURE: 53 MMHG | SYSTOLIC BLOOD PRESSURE: 140 MMHG

## 2018-10-08 VITALS — SYSTOLIC BLOOD PRESSURE: 147 MMHG | DIASTOLIC BLOOD PRESSURE: 61 MMHG

## 2018-10-08 VITALS — DIASTOLIC BLOOD PRESSURE: 65 MMHG | SYSTOLIC BLOOD PRESSURE: 135 MMHG

## 2018-10-08 DIAGNOSIS — R55: Primary | ICD-10-CM

## 2018-10-08 DIAGNOSIS — I10: ICD-10-CM

## 2018-10-08 DIAGNOSIS — R42: ICD-10-CM

## 2018-10-08 LAB
ADD MANUAL DIFF: NO
ALBUMIN SERPL-MCNC: 3.3 G/DL (ref 3.4–5)
ALBUMIN/GLOB SERPL: 0.9 {RATIO} (ref 1–2.7)
ALP SERPL-CCNC: 65 U/L (ref 46–116)
ALT SERPL-CCNC: 25 U/L (ref 12–78)
ANION GAP SERPL CALC-SCNC: 7 MMOL/L (ref 5–15)
AST SERPL-CCNC: 23 U/L (ref 15–37)
BASOPHILS NFR BLD AUTO: 0.4 % (ref 0–2)
BILIRUB SERPL-MCNC: 0.4 MG/DL (ref 0.2–1)
BUN SERPL-MCNC: 31 MG/DL (ref 7–18)
CALCIUM SERPL-MCNC: 9.1 MG/DL (ref 8.5–10.1)
CHLORIDE SERPL-SCNC: 106 MMOL/L (ref 98–107)
CO2 SERPL-SCNC: 29 MMOL/L (ref 21–32)
CREAT SERPL-MCNC: 1.1 MG/DL (ref 0.55–1.3)
EOSINOPHIL NFR BLD AUTO: 1.8 % (ref 0–3)
ERYTHROCYTE [DISTWIDTH] IN BLOOD BY AUTOMATED COUNT: 11.8 % (ref 11.6–14.8)
GLOBULIN SER-MCNC: 3.6 G/DL
HCT VFR BLD CALC: 33.7 % (ref 42–52)
HGB BLD-MCNC: 11.2 G/DL (ref 14.2–18)
LYMPHOCYTES NFR BLD AUTO: 26.9 % (ref 20–45)
MCV RBC AUTO: 93 FL (ref 80–99)
MONOCYTES NFR BLD AUTO: 8.7 % (ref 1–10)
NEUTROPHILS NFR BLD AUTO: 62.3 % (ref 45–75)
PLATELET # BLD: 108 K/UL (ref 150–450)
POTASSIUM SERPL-SCNC: 4.8 MMOL/L (ref 3.5–5.1)
RBC # BLD AUTO: 3.62 M/UL (ref 4.7–6.1)
SODIUM SERPL-SCNC: 142 MMOL/L (ref 136–145)
WBC # BLD AUTO: 3.8 K/UL (ref 4.8–10.8)

## 2018-10-08 PROCEDURE — 36415 COLL VENOUS BLD VENIPUNCTURE: CPT

## 2018-10-08 PROCEDURE — 83735 ASSAY OF MAGNESIUM: CPT

## 2018-10-08 PROCEDURE — 99285 EMERGENCY DEPT VISIT HI MDM: CPT

## 2018-10-08 PROCEDURE — 71045 X-RAY EXAM CHEST 1 VIEW: CPT

## 2018-10-08 PROCEDURE — 80053 COMPREHEN METABOLIC PANEL: CPT

## 2018-10-08 PROCEDURE — 84484 ASSAY OF TROPONIN QUANT: CPT

## 2018-10-08 PROCEDURE — 93005 ELECTROCARDIOGRAM TRACING: CPT

## 2018-10-08 PROCEDURE — 85025 COMPLETE CBC W/AUTO DIFF WBC: CPT

## 2018-10-08 NOTE — DIAGNOSTIC IMAGING REPORT
Indication: Chest pain

 

Comparison:  10/12/2016

 

A single view chest radiograph was obtained.

 

Findings:

 

Cardiomediastinal appearance is within normal limits for age. The lungs are clear.

Pulmonary vascularity is appropriate. The diaphragmatic contour is smooth and

costophrenic angles are sharp. There is heterogeneous hyperlucency projected over the

right hemidiaphragm may be due to interposed bowel. No pleural effusions are

identified. The bones are unremarkable.

 

Impression: No acute findings

## 2018-10-08 NOTE — EMERGENCY ROOM REPORT
History of Present Illness


General


Chief Complaint:  Syncope


Source:  Patient, Medical Record





Present Illness


HPI


Mr. Horne is a very pleasant healthy 89-year-old male with history of elevated 

PSA, BPH, hypertension and irregular heartbeat who presents with syncopal 

episode today.  Wife witnessed the episode.  Patient was seated at a restaurant 

eating salid when suddnenly his eyes rolled back to head.  His head slumped 

over.  He was unconscious for 1-2 minutes.  No seizure activity.  Patient had 

preceding symptoms of dizziness.  Currently has nausea.  He denies headache.  

Denies chest pain.





Today, he received Lupron injection.





For the past 6 months he has been followed closely by his cardiologist with 

Holter monitoring.  He has had rapid and slow heartbeats.  Cardiologist did 

consider pacemaker placement.





Dr. Gordon PCP


Cardiology [facility]


Allergies:  


Coded Allergies:  


     NO KNOWN ALLERGIES (Unverified  Allergy, Unknown, 6/19/15)





Patient History


Social History:  Denies: smoking, alcohol use, drug use


Social History Narrative


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


Past Medical History:  No History, Except For


Hx Cardiac Problems:  Yes


Hx Hypertension:  Yes


Hx Cancer:  Yes


Hx Gastrointestinal Problems:  Yes


Hx Dialysis:  No - BPH


Hx Neurological Problems:  No





Review of Systems


Constitutional:  Denies: fever, malaise


Cardiovascular:  Denies: chest pain


Neurological:  Reports: syncope, dizziness


All Other Systems:  negative except mentioned in HPI





Physical Exam





Vital Signs








  Date Time  Temp Pulse Resp B/P (MAP) Pulse Ox O2 Delivery O2 Flow Rate FiO2


 


10/8/18 16:09  80 16 102/60 98 Room Air  








Sp02 EP Interpretation:  reviewed, normal


General Appearance:  no apparent distress, alert, GCS 15, non-toxic


Head:  normocephalic, atraumatic


Eyes:  bilateral eye normal inspection


ENT:  hearing grossly normal, normal pharynx, no angioedema, normal voice


Neck:  full range of motion, supple/symm/no masses


Respiratory:  chest non-tender, lungs clear, normal breath sounds, no rhonchi, 

no respiratory distress, no retraction, no accessory muscle use, speaking full 

sentences


Cardiovascular #1:  regular rate, rhythm, no edema, no gallop, no JVD, no murmur

, no rub


Gastrointestinal:  normal bowel sounds, non tender, soft, non-distended, no 

guarding, no rebound


Genitourinary:  normal inspection


Musculoskeletal:  back normal, gait/station normal, normal range of motion, non-

tender


Neurologic:  alert, oriented x3, responsive, motor strength/tone normal, 

sensory intact, speech normal


Psychiatric:  judgement/insight normal, memory normal, mood/affect normal


Reflexes:  3+ knee (L)


Skin:  normal color, no rash, warm/dry, well hydrated





Medical Decision Making


Diagnostic Impression:  


 Primary Impression:  


 Syncope


ER Course


Mr. Horne presents with syncopal episode.  I am concerned for SSS with hx of 

rapid and slow heart beat for past 6 months.  No indication of ACS or PE.  

Lupron can cause QT prolongation and seizures.  





Patient will need admission for cardiac monitoring.  He has taken a new 

medication for his HR.  Hypotension due to medication is also a consideration.





I spoke to Dr. Gordon PCP requesting admission.  He desired for patient to 

be driven by wife to [facility] ED for evaluation by his personal 

cardiologist.  Wife and patient understand plan.  Dc'd to drive to nearby 

hospital.


EKG Diagnostic Results


EKG Time:  16:15


Rate:  normal


Rhythm:  NSR


ST Segments:  no acute changes


Other Impression


LBBB first deg AV block prolonged QT interval





Rhythm Strip Diag. Results


Rhythm Strip Time:  16:15


EP Interpretation:  yes


Rhythm:  NSR


Other Impression


rate 70 bpm left axis deviation LBBB first degree AV block prolonged QT interval





Chest X-Ray Diagnostic Results


Chest X-Ray Diagnostic Results :  


   Chest X-Ray Ordered:  Yes


   # of Views/Limited/Complete:  1 View


   Indication:  Other - syncope


   EP Interpretation:  Yes


   Interpretation:  no consolidation, no effusion, no pneumothorax


   Impression:  Other - elevated hemidiaphragm


   Electronically Signed by:  This image has been electronically signed by Dr. Noelle Rogel





Last Vital Signs








  Date Time  Temp Pulse Resp B/P (MAP) Pulse Ox O2 Delivery O2 Flow Rate FiO2


 


10/8/18 16:42   16 102/60 98 Room Air  


 


10/8/18 16:09  80      








Status:  unchanged


Disposition:  ADMITTED AS INPATIENT


Condition:  Stable











Noelle Rogel MD Oct 8, 2018 18:04

## 2018-10-09 NOTE — CARDIOLOGY REPORT
--------------- APPROVED REPORT --------------





EKG Measurement

Heart [id number]

TX 

[id number]

[id number]

[id number]

[id number]





Sinus rhythm with 1st degree AV block

Left axis deviation

Left bundle branch block

Abnormal ECG

## 2018-11-05 ENCOUNTER — HOSPITAL ENCOUNTER (OUTPATIENT)
Dept: HOSPITAL 72 - PAN | Age: 83
Discharge: HOME | End: 2018-11-05
Payer: MEDICARE

## 2018-11-05 VITALS — SYSTOLIC BLOOD PRESSURE: 126 MMHG | DIASTOLIC BLOOD PRESSURE: 63 MMHG

## 2018-11-05 DIAGNOSIS — A04.8: ICD-10-CM

## 2018-11-05 DIAGNOSIS — K31.7: Primary | ICD-10-CM

## 2018-11-05 DIAGNOSIS — Z95.0: ICD-10-CM

## 2018-11-05 PROCEDURE — 99212 OFFICE O/P EST SF 10 MIN: CPT

## 2018-11-05 NOTE — GI PROGRESS NOTE
Assessment/Plan


Problems:  


(1) Gastric polyp


ICD Codes:  K31.7 - Polyp of stomach and duodenum


SNOMED:  49574174


(2) H. pylori infection


ICD Codes:  A04.8 - Other specified bacterial intestinal infections


SNOMED:  047047724


Status:  stable


Status Narrative


Seen with Dr. Ozuna.


Assessment/Plan


New pacemaker placement


BT test result reviewed with patient >> negative


RTC [id number]


Repeat EGD in 1 year given hx of gastric polyps with low grade dysplasia.





The patient was seen and examined at bedside and all new and available data was 

reviewed in the patients chart. I agree with the above findings, impression 

and plan.  (Patient seen earlier today. Signature stamp does not reflect 

patient encounter time.). - Michele Ozuna MD





Subjective


Gastrointestinal/Abdominal:  Reports: no symptoms





Objective





Last 24 Hour Vital Signs








  Date Time  Temp Pulse Resp B/P (MAP) Pulse Ox O2 Delivery O2 Flow Rate FiO2


 


11/5/18 15:17 98.6 67  126/63 95   








General Appearance:  WD/WN, no apparent distress, alert


Cardiovascular:  normal rate


Respiratory/Chest:  normal breath sounds, no respiratory distress


Abdominal Exam:  normal bowel sounds, non tender, soft


Extremities:  normal range of motion, non-tender











Christ Telles NP Nov 5, 2018 22:16

## 2019-02-04 ENCOUNTER — HOSPITAL ENCOUNTER (OUTPATIENT)
Dept: HOSPITAL 72 - PAN | Age: 84
Discharge: HOME | End: 2019-02-04
Payer: MEDICARE

## 2019-02-04 VITALS — SYSTOLIC BLOOD PRESSURE: 133 MMHG | DIASTOLIC BLOOD PRESSURE: 57 MMHG

## 2019-02-04 DIAGNOSIS — K31.7: ICD-10-CM

## 2019-02-04 DIAGNOSIS — Z86.010: ICD-10-CM

## 2019-02-04 DIAGNOSIS — A04.8: Primary | ICD-10-CM

## 2019-02-04 DIAGNOSIS — R63.4: ICD-10-CM

## 2019-02-04 DIAGNOSIS — K57.90: ICD-10-CM

## 2019-02-04 DIAGNOSIS — K59.00: ICD-10-CM

## 2019-02-04 DIAGNOSIS — C61: ICD-10-CM

## 2019-02-04 PROCEDURE — 99212 OFFICE O/P EST SF 10 MIN: CPT

## 2019-02-04 NOTE — GI PROGRESS NOTE
Assessment/Plan


Problems:  


(1) H. pylori infection


ICD Codes:  A04.8 - Other specified bacterial intestinal infections


SNOMED:  770353404


(2) Gastric polyp


ICD Codes:  K31.7 - Polyp of stomach and duodenum


SNOMED:  32792885


(3) Hx of colonic polyp


ICD Codes:  Z86.010 - Personal history of colonic polyps


SNOMED:  613807250


(4) Constipation


ICD Codes:  K59.00 - Constipation, unspecified


SNOMED:  22911189


(5) Diverticulosis


ICD Codes:  K57.90 - Diverticulosis of intestine, part unspecified, without 

perforation or abscess without bleeding


SNOMED:  57614342


(6) Weight loss, unintentional


ICD Codes:  R63.4 - Abnormal weight loss


SNOMED:  784883979


(7) Colonoscopy planned


SNOMED:  489323849


(8) Prostate CA


ICD Codes:  C61 - Malignant neoplasm of prostate


SNOMED:  080398085


Status:  stable


Status Narrative


Seen with Dr. Ozuna.


Assessment/Plan


History of H. pylori infection status post treatment with a negative breath test


Reported weight gain


RTC x 3 months


Needs repeat EGD in November 2019





The patient was seen and examined at bedside and all new and available data was 

reviewed in the patients chart. I agree with the above findings, impression 

and plan.  (Patient seen earlier today. Signature stamp does not reflect 

patient encounter time.). - Michele Ozuna MD





Subjective


Subjective


Abdominal pain resolved at this time


Constipated, taking stool softeners as needed


Eating very well


States she has random mucus production





Objective


Temperature is 98.2


Blood pressure 133/57


Posterior 64


94% room air


General Appearance:  WD/WN, no apparent distress, alert


Cardiovascular:  normal rate


Respiratory/Chest:  normal breath sounds, no respiratory distress


Abdominal Exam:  normal bowel sounds, non tender, soft


Extremities:  normal range of motion, non-tender











Gabby TelleshNam NP Feb 4, 2019 15:00